# Patient Record
Sex: FEMALE | Race: BLACK OR AFRICAN AMERICAN | HISPANIC OR LATINO | URBAN - METROPOLITAN AREA
[De-identification: names, ages, dates, MRNs, and addresses within clinical notes are randomized per-mention and may not be internally consistent; named-entity substitution may affect disease eponyms.]

---

## 2018-12-17 ENCOUNTER — EMERGENCY (EMERGENCY)
Facility: HOSPITAL | Age: 23
LOS: 0 days | Discharge: HOME | End: 2018-12-17
Attending: EMERGENCY MEDICINE
Payer: COMMERCIAL

## 2018-12-17 VITALS
SYSTOLIC BLOOD PRESSURE: 141 MMHG | HEART RATE: 89 BPM | DIASTOLIC BLOOD PRESSURE: 79 MMHG | TEMPERATURE: 98 F | RESPIRATION RATE: 16 BRPM | OXYGEN SATURATION: 99 %

## 2018-12-17 VITALS
WEIGHT: 225.09 LBS | OXYGEN SATURATION: 98 % | TEMPERATURE: 98 F | HEART RATE: 76 BPM | DIASTOLIC BLOOD PRESSURE: 79 MMHG | SYSTOLIC BLOOD PRESSURE: 129 MMHG | HEIGHT: 63 IN | RESPIRATION RATE: 17 BRPM

## 2018-12-17 DIAGNOSIS — F32.9 MAJOR DEPRESSIVE DISORDER, SINGLE EPISODE, UNSPECIFIED: ICD-10-CM

## 2018-12-17 DIAGNOSIS — F32.1 MAJOR DEPRESSIVE DISORDER, SINGLE EPISODE, MODERATE: ICD-10-CM

## 2018-12-17 LAB
ALBUMIN SERPL ELPH-MCNC: 4 G/DL — SIGNIFICANT CHANGE UP (ref 3.3–5)
ALP SERPL-CCNC: 92 U/L — SIGNIFICANT CHANGE UP (ref 40–120)
ALT FLD-CCNC: 18 U/L — SIGNIFICANT CHANGE UP (ref 12–78)
AMPHET UR-MCNC: NEGATIVE — SIGNIFICANT CHANGE UP
ANION GAP SERPL CALC-SCNC: 8 MMOL/L — SIGNIFICANT CHANGE UP (ref 5–17)
APAP SERPL-MCNC: < 2 UG/ML (ref 10–30)
APPEARANCE UR: CLEAR — SIGNIFICANT CHANGE UP
APTT BLD: 32.7 SEC — SIGNIFICANT CHANGE UP (ref 27.5–36.3)
AST SERPL-CCNC: 12 U/L — LOW (ref 15–37)
BACTERIA # UR AUTO: ABNORMAL
BARBITURATES UR SCN-MCNC: NEGATIVE — SIGNIFICANT CHANGE UP
BASOPHILS # BLD AUTO: 0.06 K/UL — SIGNIFICANT CHANGE UP (ref 0–0.2)
BASOPHILS NFR BLD AUTO: 0.5 % — SIGNIFICANT CHANGE UP (ref 0–2)
BENZODIAZ UR-MCNC: NEGATIVE — SIGNIFICANT CHANGE UP
BILIRUB SERPL-MCNC: 0.2 MG/DL — SIGNIFICANT CHANGE UP (ref 0.2–1.2)
BILIRUB UR-MCNC: NEGATIVE — SIGNIFICANT CHANGE UP
BUN SERPL-MCNC: 10 MG/DL — SIGNIFICANT CHANGE UP (ref 7–23)
CALCIUM SERPL-MCNC: 8.8 MG/DL — SIGNIFICANT CHANGE UP (ref 8.5–10.1)
CHLORIDE SERPL-SCNC: 107 MMOL/L — SIGNIFICANT CHANGE UP (ref 96–108)
CO2 SERPL-SCNC: 24 MMOL/L — SIGNIFICANT CHANGE UP (ref 22–31)
COCAINE METAB.OTHER UR-MCNC: NEGATIVE — SIGNIFICANT CHANGE UP
COLOR SPEC: YELLOW — SIGNIFICANT CHANGE UP
CREAT SERPL-MCNC: 0.71 MG/DL — SIGNIFICANT CHANGE UP (ref 0.5–1.3)
DIFF PNL FLD: ABNORMAL
EOSINOPHIL # BLD AUTO: 0.11 K/UL — SIGNIFICANT CHANGE UP (ref 0–0.5)
EOSINOPHIL NFR BLD AUTO: 0.9 % — SIGNIFICANT CHANGE UP (ref 0–6)
EPI CELLS # UR: SIGNIFICANT CHANGE UP
ETHANOL SERPL-MCNC: <10 MG/DL — SIGNIFICANT CHANGE UP (ref 0–10)
GLUCOSE SERPL-MCNC: 92 MG/DL — SIGNIFICANT CHANGE UP (ref 70–99)
GLUCOSE UR QL: NEGATIVE — SIGNIFICANT CHANGE UP
HCG SERPL-ACNC: <1 MIU/ML — SIGNIFICANT CHANGE UP
HCT VFR BLD CALC: 41.1 % — SIGNIFICANT CHANGE UP (ref 34.5–45)
HGB BLD-MCNC: 13.3 G/DL — SIGNIFICANT CHANGE UP (ref 11.5–15.5)
IMM GRANULOCYTES NFR BLD AUTO: 0.2 % — SIGNIFICANT CHANGE UP (ref 0–1.5)
INR BLD: 1.19 RATIO — HIGH (ref 0.88–1.16)
KETONES UR-MCNC: NEGATIVE — SIGNIFICANT CHANGE UP
LEUKOCYTE ESTERASE UR-ACNC: NEGATIVE — SIGNIFICANT CHANGE UP
LYMPHOCYTES # BLD AUTO: 17.4 % — SIGNIFICANT CHANGE UP (ref 13–44)
LYMPHOCYTES # BLD AUTO: 2.03 K/UL — SIGNIFICANT CHANGE UP (ref 1–3.3)
MCHC RBC-ENTMCNC: 28.5 PG — SIGNIFICANT CHANGE UP (ref 27–34)
MCHC RBC-ENTMCNC: 32.4 GM/DL — SIGNIFICANT CHANGE UP (ref 32–36)
MCV RBC AUTO: 88 FL — SIGNIFICANT CHANGE UP (ref 80–100)
METHADONE UR-MCNC: NEGATIVE — SIGNIFICANT CHANGE UP
MONOCYTES # BLD AUTO: 0.83 K/UL — SIGNIFICANT CHANGE UP (ref 0–0.9)
MONOCYTES NFR BLD AUTO: 7.1 % — SIGNIFICANT CHANGE UP (ref 2–14)
NEUTROPHILS # BLD AUTO: 8.62 K/UL — HIGH (ref 1.8–7.4)
NEUTROPHILS NFR BLD AUTO: 73.9 % — SIGNIFICANT CHANGE UP (ref 43–77)
NITRITE UR-MCNC: NEGATIVE — SIGNIFICANT CHANGE UP
NRBC # BLD: 0 /100 WBCS — SIGNIFICANT CHANGE UP (ref 0–0)
OPIATES UR-MCNC: NEGATIVE — SIGNIFICANT CHANGE UP
PCP SPEC-MCNC: SIGNIFICANT CHANGE UP
PCP UR-MCNC: NEGATIVE — SIGNIFICANT CHANGE UP
PH UR: 7 — SIGNIFICANT CHANGE UP (ref 5–8)
PLATELET # BLD AUTO: 333 K/UL — SIGNIFICANT CHANGE UP (ref 150–400)
POTASSIUM SERPL-MCNC: 4.2 MMOL/L — SIGNIFICANT CHANGE UP (ref 3.5–5.3)
POTASSIUM SERPL-SCNC: 4.2 MMOL/L — SIGNIFICANT CHANGE UP (ref 3.5–5.3)
PROT SERPL-MCNC: 7.9 GM/DL — SIGNIFICANT CHANGE UP (ref 6–8.3)
PROT UR-MCNC: NEGATIVE — SIGNIFICANT CHANGE UP
PROTHROM AB SERPL-ACNC: 13.4 SEC — HIGH (ref 10–12.9)
RBC # BLD: 4.67 M/UL — SIGNIFICANT CHANGE UP (ref 3.8–5.2)
RBC # FLD: 13.3 % — SIGNIFICANT CHANGE UP (ref 10.3–14.5)
RBC CASTS # UR COMP ASSIST: ABNORMAL /HPF (ref 0–4)
SALICYLATES SERPL-MCNC: <1.7 MG/DL — LOW (ref 2.8–20)
SODIUM SERPL-SCNC: 139 MMOL/L — SIGNIFICANT CHANGE UP (ref 135–145)
SP GR SPEC: 1.01 — SIGNIFICANT CHANGE UP (ref 1.01–1.02)
THC UR QL: NEGATIVE — SIGNIFICANT CHANGE UP
UROBILINOGEN FLD QL: NEGATIVE — SIGNIFICANT CHANGE UP
WBC # BLD: 11.67 K/UL — HIGH (ref 3.8–10.5)
WBC # FLD AUTO: 11.67 K/UL — HIGH (ref 3.8–10.5)
WBC UR QL: NEGATIVE — SIGNIFICANT CHANGE UP

## 2018-12-17 PROCEDURE — 99284 EMERGENCY DEPT VISIT MOD MDM: CPT

## 2018-12-17 PROCEDURE — 90792 PSYCH DIAG EVAL W/MED SRVCS: CPT | Mod: GT

## 2018-12-17 RX ORDER — ACETAMINOPHEN 500 MG
650 TABLET ORAL ONCE
Qty: 0 | Refills: 0 | Status: COMPLETED | OUTPATIENT
Start: 2018-12-17 | End: 2018-12-17

## 2018-12-17 RX ADMIN — Medication 650 MILLIGRAM(S): at 19:23

## 2018-12-17 NOTE — ED BEHAVIORAL HEALTH ASSESSMENT NOTE - OTHER PAST PSYCHIATRIC HISTORY (INCLUDE DETAILS REGARDING ONSET, COURSE OF ILLNESS, INPATIENT/OUTPATIENT TREATMENT)
one prior psychiatric admission in 2014 for suicidal ideation and overdose on pills, currently in outpatient therapy with Dr Valverde since Sept '18 Respiratory
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
## 2018-12-17 NOTE — ED PROVIDER NOTE - OBJECTIVE STATEMENT
22 yo female with history of one suicidal attempt in the past as teenager with pill ingestion (2 week hospitalization at that time) presents  states that she has "depression". Patient with intermittent suicidal ideology without plan. Patient states that she has lost two family confidantes in last 1.5 years. Patient denies alcohol or pill ingestion, HI, fever, chills, cough, recent suicidal attempt. Patient states that she feels that if she does not speak to someone she will continue to get "worse".
Yes

## 2018-12-17 NOTE — ED BEHAVIORAL HEALTH ASSESSMENT NOTE - DESCRIPTION
n/a lives with her family, works at [employer] Patient arrived to the ED approximately 5 hours prior to consultation. Per ED RN and documentation patient arrived alert and oriented x3, no issues with grooming or hygiene, cooperative with ED medical and safety protocols with no items of note in property. Patient has depressed mood and affect, she is tearful at times in the ED, she has linear thought process and normal speech, she denies current suicidal or homicidal ideation, she does not appear psychotic, manic, delusional or paranoid, no other noted relevant MSE elements by ED staff. Patient was not observed to eat or sleep at time of contact. Patient was calm and appropriate in the ED, did not require PRN psychiatric medication. Patient’s father is at bedside and there are no issues noted with interaction.

## 2018-12-17 NOTE — ED PROVIDER NOTE - CONSTITUTIONAL, MLM
normal... Well appearing, well nourished, awake, alert, oriented to person, place, time/situation and in no apparent distress, tearful

## 2018-12-17 NOTE — ED BEHAVIORAL HEALTH ASSESSMENT NOTE - RISK ASSESSMENT
She is at moderately elevated risk of danger to herself given her psychiatric history and suicide attempt, however she is at low risk of acute and imminent danger to herself based on her current mental status exam.

## 2018-12-17 NOTE — ED ADULT NURSE NOTE - CAS EDP DISCH TYPE
I have reviewed the H&P which is current within the last 30 days. I have examined the patient, and the changes to the patient's condition are as follows: none. Risks and benefits, as well as alternatives of umbilical hernia repair and liver biopsy were discussed with the patient and they would like to proceed.     Visit Vitals  /70   Pulse 70   Temp 97.2 °F (36.2 °C) (Temporal Artery)   Resp 18   Ht 5' 2\" (1.575 m)   Wt 74.1 kg   SpO2 97%   BMI 29.88 kg/m²       Date of service: 11/20/2017    
I reviewed the H&P, I examined the patient, and there are no changes in the patient's condition.    
Home

## 2018-12-17 NOTE — ED PROVIDER NOTE - MEDICAL DECISION MAKING DETAILS
Patient awaiting psych consult Patient awaiting psych consult, medically stable for pyschiatric consult

## 2018-12-17 NOTE — ED ADULT NURSE NOTE - OBJECTIVE STATEMENT
Pt state I feeling really sad. hx of depression. Denies any SI or HI. CO initiated. Father at the bedside

## 2018-12-17 NOTE — ED BEHAVIORAL HEALTH ASSESSMENT NOTE - SUICIDE PROTECTIVE FACTORS
Identifies reasons for living/Future oriented/Responsibility to family and others/Positive therapeutic relationships/Supportive social network or family/Engaged in work or school

## 2018-12-17 NOTE — ED PROVIDER NOTE - PROGRESS NOTE DETAILS
Dr. Alan aware of consult Patient received in sign out from Dr. Kay pending work up by psychiatry for suicidal ideation.  Patient received evaluation and is cleared by psych for o/p care with her therapist, also psych resources provided. Patient discharged to care of her father who is present in the ER. Discussed results and outcome of today's visit with the patient.  Patient advised to please follow up with another healthcare provider within the next 24 hours and return to the Emergency Department for worsening symptoms or any other concerns.  Patient advised that their doctor may call  to follow up on the specific results of the tests performed today in the emergency department.   Patient appears well on discharge. Patient received in sign out from Dr. Kay pending work up by psychiatry for suicidal ideation.  Patient received evaluation and is cleared by psych for o/p care with her therapist, also psych resources provided, no meds recommended at this time. Patient discharged to care of her father who is present in the ER. Discussed results and outcome of today's visit with the patient.  Patient advised to please follow up with another healthcare provider within the next 24 hours and return to the Emergency Department for worsening symptoms or any other concerns.  Patient advised that their doctor may call  to follow up on the specific results of the tests performed today in the emergency department.   Patient appears well on discharge.

## 2018-12-17 NOTE — ED BEHAVIORAL HEALTH ASSESSMENT NOTE - HPI (INCLUDE ILLNESS QUALITY, SEVERITY, DURATION, TIMING, CONTEXT, MODIFYING FACTORS, ASSOCIATED SIGNS AND SYMPTOMS)
24 yo [employer] [facility] F with a history of depression, one prior suicide attempt, one prior inpatient hospitalization in '14, in therapy with Dr Valverde, employed at [employer], domiciled with her family, not in school, with no legal history presents with complaints of suicidal ideation; in the context of worsening depression since Aug '18 and a variety of psychosocial stressors. Pt states that she was feeling depressed and having a suicidal thought, and couldn't speak to her mother who is in [place] for the next two weeks, while her therapist is on vacation for the next two weeks as well. Pt states that she had no one else to talk to. Describes that she feels guilty and feels like a burden if she were to talk to someone in her family about her stress and depression. 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 Pt reports that she feels sad, and has thought about not going into work, but has not missed a day yet. Denies any suicidal ideation or NSSI urges at this time. States that she has sleep struggles for the past few weeks. Denies AVH / PI / IOR. Denies any decreased need for sleep with increased energy. No evidence of acute psychosis or shiva on exam. 22 [facility] F with a history of depression, one prior suicide attempt, one prior inpatient hospitalization in , in therapy with Dr Valverde, employed at [employer], domiciled with her family, not in school, with no legal history presents with complaints of suicidal ideation; in the context of worsening depression since Aug '18 and a variety of psychosocial stressors. Pt states that she was feeling depressed and having a suicidal thought, and couldn't speak to her mother who is in [facility] for the next two weeks, while her therapist is on vacation for the next two weeks as well. Pt states that she had no one else to talk to. Describes that she feels guilty and feels like a burden if she were to talk to someone in her family about her stress and depression. 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 Pt reports that she feels sad, and has thought about not going into work, but has not missed a day yet. Denies any suicidal ideation or NSSI urges at this time. States that she has sleep struggles for the past few weeks. Denies AVH / PI / IOR. Denies any decreased need for sleep with increased energy. No evidence of acute psychosis or shiva on exam.    Collateral obtained from father: Per collateral the HPI begins 3 weeks ago. 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 Collateral states patient at baseline has intermittent periods of depression for the past several years where patient has depressed mood, crying, and isolation. Patient drinks Etoh socially, no known drug use, daily cigarette smoker. Patient has 1 prior hospitalization at [facility] 2014, per father this was for suicidal ideation, however patient reported it was a suicide attempt by overdose. Patient was in treatment briefly after first hospitalization but has spent the past several years without treatment or medication until starting to see a therapist weekly 3 months ago. Collateral states patient’s aunt  around 2 months ago and her great-grandmother  last month. 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 For the past 3 weeks patient has been crying often, depressed mood, isolative, and staying up late on her phone. Last week patient called mother and reported thoughts of hurting herself, she was able to be consoled by family at that time and no acute intervention occurred. Today patient called her father and told her she was again having thoughts of hurting herself without plan or intent but wanted to speak with someone so father assisted patient to the ED. Father states patient has not been reporting suicidal ideation since she last called her mother, no self-harm, patient has no thoughts or harming others and has not been violent. Patient has no reported/observed symptoms of psychosis or shiva, no other changes in mood or behavior noted. Patient has been sleeping, eating, and tending to ADLS, she has been making it to work. Father does not believe patient is an acute risk to self or others, believes patient needs to consider medication for her depression. Father feels patient is safe to be discharged home at this time.

## 2019-04-25 ENCOUNTER — RESULT REVIEW (OUTPATIENT)
Age: 24
End: 2019-04-25

## 2019-09-07 ENCOUNTER — EMERGENCY (EMERGENCY)
Facility: HOSPITAL | Age: 24
LOS: 1 days | Discharge: HOME | End: 2019-09-07
Attending: EMERGENCY MEDICINE | Admitting: EMERGENCY MEDICINE
Payer: COMMERCIAL

## 2019-09-07 VITALS
RESPIRATION RATE: 16 BRPM | HEART RATE: 86 BPM | TEMPERATURE: 98 F | OXYGEN SATURATION: 98 % | DIASTOLIC BLOOD PRESSURE: 65 MMHG | SYSTOLIC BLOOD PRESSURE: 120 MMHG

## 2019-09-07 LAB
ALBUMIN SERPL ELPH-MCNC: 4.2 G/DL — SIGNIFICANT CHANGE UP (ref 3.3–5)
ALP SERPL-CCNC: 61 U/L — SIGNIFICANT CHANGE UP (ref 40–120)
ALT FLD-CCNC: 18 U/L — SIGNIFICANT CHANGE UP (ref 4–33)
ANION GAP SERPL CALC-SCNC: 11 MMO/L — SIGNIFICANT CHANGE UP (ref 7–14)
APTT BLD: 29.4 SEC — SIGNIFICANT CHANGE UP (ref 27.5–36.3)
AST SERPL-CCNC: 14 U/L — SIGNIFICANT CHANGE UP (ref 4–32)
BASOPHILS # BLD AUTO: 0.07 K/UL — SIGNIFICANT CHANGE UP (ref 0–0.2)
BASOPHILS NFR BLD AUTO: 0.7 % — SIGNIFICANT CHANGE UP (ref 0–2)
BILIRUB SERPL-MCNC: 0.2 MG/DL — SIGNIFICANT CHANGE UP (ref 0.2–1.2)
BUN SERPL-MCNC: 9 MG/DL — SIGNIFICANT CHANGE UP (ref 7–23)
CALCIUM SERPL-MCNC: 9.6 MG/DL — SIGNIFICANT CHANGE UP (ref 8.4–10.5)
CHLORIDE SERPL-SCNC: 103 MMOL/L — SIGNIFICANT CHANGE UP (ref 98–107)
CO2 SERPL-SCNC: 25 MMOL/L — SIGNIFICANT CHANGE UP (ref 22–31)
CREAT SERPL-MCNC: 0.7 MG/DL — SIGNIFICANT CHANGE UP (ref 0.5–1.3)
EOSINOPHIL # BLD AUTO: 0.3 K/UL — SIGNIFICANT CHANGE UP (ref 0–0.5)
EOSINOPHIL NFR BLD AUTO: 3 % — SIGNIFICANT CHANGE UP (ref 0–6)
GLUCOSE SERPL-MCNC: 85 MG/DL — SIGNIFICANT CHANGE UP (ref 70–99)
HCG SERPL-ACNC: < 5 MIU/ML — SIGNIFICANT CHANGE UP
HCT VFR BLD CALC: 40.5 % — SIGNIFICANT CHANGE UP (ref 34.5–45)
HGB BLD-MCNC: 13 G/DL — SIGNIFICANT CHANGE UP (ref 11.5–15.5)
IMM GRANULOCYTES NFR BLD AUTO: 0.5 % — SIGNIFICANT CHANGE UP (ref 0–1.5)
INR BLD: 1.01 — SIGNIFICANT CHANGE UP (ref 0.88–1.17)
LYMPHOCYTES # BLD AUTO: 2.32 K/UL — SIGNIFICANT CHANGE UP (ref 1–3.3)
LYMPHOCYTES # BLD AUTO: 23.5 % — SIGNIFICANT CHANGE UP (ref 13–44)
MCHC RBC-ENTMCNC: 28.8 PG — SIGNIFICANT CHANGE UP (ref 27–34)
MCHC RBC-ENTMCNC: 32.1 % — SIGNIFICANT CHANGE UP (ref 32–36)
MCV RBC AUTO: 89.6 FL — SIGNIFICANT CHANGE UP (ref 80–100)
MONOCYTES # BLD AUTO: 0.66 K/UL — SIGNIFICANT CHANGE UP (ref 0–0.9)
MONOCYTES NFR BLD AUTO: 6.7 % — SIGNIFICANT CHANGE UP (ref 2–14)
NEUTROPHILS # BLD AUTO: 6.47 K/UL — SIGNIFICANT CHANGE UP (ref 1.8–7.4)
NEUTROPHILS NFR BLD AUTO: 65.6 % — SIGNIFICANT CHANGE UP (ref 43–77)
NRBC # FLD: 0 K/UL — SIGNIFICANT CHANGE UP (ref 0–0)
PLATELET # BLD AUTO: 319 K/UL — SIGNIFICANT CHANGE UP (ref 150–400)
PMV BLD: 10.7 FL — SIGNIFICANT CHANGE UP (ref 7–13)
POTASSIUM SERPL-MCNC: 4.2 MMOL/L — SIGNIFICANT CHANGE UP (ref 3.5–5.3)
POTASSIUM SERPL-SCNC: 4.2 MMOL/L — SIGNIFICANT CHANGE UP (ref 3.5–5.3)
PROT SERPL-MCNC: 7.5 G/DL — SIGNIFICANT CHANGE UP (ref 6–8.3)
PROTHROM AB SERPL-ACNC: 11.5 SEC — SIGNIFICANT CHANGE UP (ref 9.8–13.1)
RBC # BLD: 4.52 M/UL — SIGNIFICANT CHANGE UP (ref 3.8–5.2)
RBC # FLD: 13.2 % — SIGNIFICANT CHANGE UP (ref 10.3–14.5)
SODIUM SERPL-SCNC: 139 MMOL/L — SIGNIFICANT CHANGE UP (ref 135–145)
WBC # BLD: 9.87 K/UL — SIGNIFICANT CHANGE UP (ref 3.8–10.5)
WBC # FLD AUTO: 9.87 K/UL — SIGNIFICANT CHANGE UP (ref 3.8–10.5)

## 2019-09-07 PROCEDURE — 99284 EMERGENCY DEPT VISIT MOD MDM: CPT

## 2019-09-07 PROCEDURE — 76830 TRANSVAGINAL US NON-OB: CPT | Mod: 26

## 2019-09-07 RX ORDER — ACETAMINOPHEN 500 MG
975 TABLET ORAL ONCE
Refills: 0 | Status: DISCONTINUED | OUTPATIENT
Start: 2019-09-07 | End: 2019-09-11

## 2019-09-07 NOTE — ED PROVIDER NOTE - NS ED ROS FT
General: +fatigue; denies fever, chills, weight loss/weight gain.  HENT: denies nasal congestion, sore throat, rhinorrhea, ear pain  Eyes: denies visual changes, blurred vision, eye discharge, eye redness  Neck: denies neck pain, neck swelling  CV: denies chest pain, palpitations  Resp: denies difficulty breathing, cough  Abdominal: denies nausea, vomiting, diarrhea, abdominal pain, blood in stool, dark stool  :  Vaginal bleed and cramping  MSK: denies muscle aches, bony pain, leg pain, leg swelling  Neuro: denies headaches, numbness, tingling, dizziness, lightheadedness.  Skin: denies rashes, cuts, bruises  Hematologic: denies unexplained bruises

## 2019-09-07 NOTE — ED PROVIDER NOTE - PATIENT PORTAL LINK FT
You can access the FollowMyHealth Patient Portal offered by [facility] by registering at the following website: [email or web address]. By joining [facility]’s FollowMyHealth portal, you will also be able to view your health information using other applications (apps) compatible with our system.

## 2019-09-07 NOTE — ED PROVIDER NOTE - PHYSICAL EXAMINATION
GENERAL: Patient awake alert NAD.  HEENT: NC/AT, Moist mucous membranes  LUNGS: CTAB, no wheezes or crackles.   CARDIAC: RRR, no m/r/g.  ABDOMEN: Soft, NT, ND, No rebound, guarding. No CVA tenderness.   :  Pelvic exam performed with Nelly Franklin RN as chaperone.  Blood seen in the vaginal vault, no discharge or CMT.  Right adnexal tenderness on bimanual exam.  EXT: No edema. No calf tenderness. CV 2+DP/PT bilaterally.   MSK: No pain with movement, no deformities.  NEURO: A&Ox3. Moving all extremities.  SKIN: Warm and dry. No rash.  PSYCH: Normal affect. GENERAL: Patient awake alert NAD.  HEENT: NC/AT, Moist mucous membranes  LUNGS: CTAB, no wheezes or crackles.   CARDIAC: RRR, no m/r/g.  ABDOMEN: Soft, NT, ND, No rebound, guarding. No CVA tenderness.   :  Pelvic exam performed with Nelly Franklin RN as chaperone.  Blood seen in the vaginal vault, no discharge or CMT.  Right adnexal tenderness on bimanual exam.  EXT: No edema. No calf tenderness. CV 2+DP/PT bilaterally.   MSK: No pain with movement, no deformities.  NEURO: A&Ox3. Moving all extremities.  SKIN: Warm and dry. No rash.  PSYCH: Normal affect.    Attending/Mahad: NAD; PERRL/EOMI, non-icterus, supple, no SAÚL, no JVD, RRR, CTAB; Abd-soft, NT/ND, no HSM; no LE edema, A&Ox3, nonfocal; Skin-warm/dry  Pelvic Ex- as per resident

## 2019-09-07 NOTE — ED PROVIDER NOTE - CLINICAL SUMMARY MEDICAL DECISION MAKING FREE TEXT BOX
Pt. is a 24 y.o. F w/ PMHx of recently diagnosed endometriosis started on OCP 3 weeks ago, ovarian cysts, depression, asthma p/w vaginal bleed for 8 days.  Considering the recently diagnosed endometriosis and hx of ovarian cysts, this is the likely cause.  Unlikely to be ectopic pregnancy based on lack of prior abortions/miscarriages, but will get serum HCG to confirm.  Unlikely to be ovarian torsion, but will get TVUS to make sure.  Will consult obgyn as pt. is complaining of fatigue.

## 2019-09-07 NOTE — ED PROVIDER NOTE - PROGRESS NOTE DETAILS
José Miguel PGY 1 - Discussed results w/ pt.  She will f/u w/ obgyn early next week.  Pt. is agreeable to d/c w/ good f/u and ED return precautions.

## 2019-09-07 NOTE — ED ADULT TRIAGE NOTE - CHIEF COMPLAINT QUOTE
vaginal spotting started 9 days ago.reports increased bleeding since sunday. reports changing pad hourly since wed. lower abd cramping.leg cramping.  strted bcpatch 3 wks ago

## 2019-09-07 NOTE — ED PROVIDER NOTE - OBJECTIVE STATEMENT
Pt. is a 24 y.o. F w/ PMHx of recently diagnosed endometriosis, ovarian cysts, asthma, depression p/w vaginal bleed since last Friday.  Pt. started having her period last Friday and her vaginal bleeding hasn't stopped yet.  Per pt., she is going through about 8-10 pads per day.  Pt. states her periods usually last about 1 week and has heavy bleeding, but this is concerning because she's starting to feel more fatigued and sometimes very sleepy at work.  Pt. also states her abdominal cramps are worse than usual.  Pt. is sexually active, but does not have vaginal intercourse.  Pt. has not been pregnant in the past.  She was started on OCPs about 3 weeks ago as her obgyn, Dr. Tacos De La Rosa, said she has the beginning stages of endometriosis.  Pt. got her TVUS at [facility]. Pt. is a 24 y.o. F w/ PMHx of recently diagnosed endometriosis, ovarian cysts, asthma, depression p/w vaginal bleed since last Friday.  Pt. started having her period last Friday and her vaginal bleeding hasn't stopped yet.  Per pt., she is going through about 8-10 pads per day.  Pt. states her periods usually last about 1 week and has heavy bleeding, but this is concerning because she's starting to feel more fatigued and sometimes very sleepy at work.  Pt. also states her abdominal cramps are worse than usual.  Pt. is sexually active, but does not have vaginal intercourse.  Pt. has not been pregnant in the past.  She was started on OCPs about 3 weeks ago as her obgyn, Dr. Tacos De La Rosa, said she has the beginning stages of endometriosis.  Pt. got her TVUS at [facility].    Attending/Mahad: 25 yo F as described above, h/o endometriosis p/w vag bleeding since Friday using multiple pads per day. While pt reports general fatigue, deneis CP, SOB, palp or weakness.

## 2019-09-07 NOTE — ED PROVIDER NOTE - NSFOLLOWUPINSTRUCTIONS_ED_ALL_ED_FT
Rest, drink plenty of fluids.  Advance activity as tolerated.  Continue all previously prescribed medications as directed.  Follow up with your primary care physician in 48-72 hours- bring copies of your results.  Return to the ER for worsening or persistent symptoms, and/or ANY NEW OR CONCERNING SYMPTOMS. If you have issues obtaining follow up, please call: [phone] to obtain a doctor or specialist who takes your insurance in your area.    Please call and make an appointment w/ your obgyn doctor sometime next week.  In the meantime, you can take 600-800mg of motrin and 1000mg of tylenol every 6 hours alternatively as needed for your pain.  Please follow the instructions on the bottle and don't exceed the maximum daily dosage of either medication.  If you have significantly more vaginal bleeding or experience pain that is absolutely intolerable, please return immediately to the emergency department.

## 2023-01-11 ENCOUNTER — EMERGENCY (EMERGENCY)
Facility: HOSPITAL | Age: 28
LOS: 1 days | Discharge: HOME | End: 2023-01-11
Attending: EMERGENCY MEDICINE | Admitting: EMERGENCY MEDICINE
Payer: COMMERCIAL

## 2023-01-11 VITALS
TEMPERATURE: 98 F | OXYGEN SATURATION: 98 % | DIASTOLIC BLOOD PRESSURE: 68 MMHG | HEART RATE: 88 BPM | RESPIRATION RATE: 16 BRPM | SYSTOLIC BLOOD PRESSURE: 125 MMHG

## 2023-01-11 VITALS
RESPIRATION RATE: 20 BRPM | OXYGEN SATURATION: 99 % | HEIGHT: 63 IN | DIASTOLIC BLOOD PRESSURE: 80 MMHG | TEMPERATURE: 98 F | HEART RATE: 98 BPM | SYSTOLIC BLOOD PRESSURE: 130 MMHG | WEIGHT: 259.93 LBS

## 2023-01-11 LAB
ALBUMIN SERPL ELPH-MCNC: 3.9 G/DL — SIGNIFICANT CHANGE UP (ref 3.3–5)
ALP SERPL-CCNC: 97 U/L — SIGNIFICANT CHANGE UP (ref 30–120)
ALT FLD-CCNC: 19 U/L DA — SIGNIFICANT CHANGE UP (ref 10–60)
ANION GAP SERPL CALC-SCNC: 9 MMOL/L — SIGNIFICANT CHANGE UP (ref 5–17)
AST SERPL-CCNC: 16 U/L — SIGNIFICANT CHANGE UP (ref 10–40)
BASOPHILS # BLD AUTO: 0.07 K/UL — SIGNIFICANT CHANGE UP (ref 0–0.2)
BASOPHILS NFR BLD AUTO: 0.6 % — SIGNIFICANT CHANGE UP (ref 0–2)
BILIRUB SERPL-MCNC: 0.2 MG/DL — SIGNIFICANT CHANGE UP (ref 0.2–1.2)
BUN SERPL-MCNC: 10 MG/DL — SIGNIFICANT CHANGE UP (ref 7–23)
CALCIUM SERPL-MCNC: 8.9 MG/DL — SIGNIFICANT CHANGE UP (ref 8.4–10.5)
CHLORIDE SERPL-SCNC: 102 MMOL/L — SIGNIFICANT CHANGE UP (ref 96–108)
CO2 SERPL-SCNC: 28 MMOL/L — SIGNIFICANT CHANGE UP (ref 22–31)
CREAT SERPL-MCNC: 0.75 MG/DL — SIGNIFICANT CHANGE UP (ref 0.5–1.3)
EGFR: 111 ML/MIN/1.73M2 — SIGNIFICANT CHANGE UP
EOSINOPHIL # BLD AUTO: 0.1 K/UL — SIGNIFICANT CHANGE UP (ref 0–0.5)
EOSINOPHIL NFR BLD AUTO: 0.9 % — SIGNIFICANT CHANGE UP (ref 0–6)
GLUCOSE SERPL-MCNC: 113 MG/DL — HIGH (ref 70–99)
HCG UR QL: NEGATIVE — SIGNIFICANT CHANGE UP
HCT VFR BLD CALC: 41 % — SIGNIFICANT CHANGE UP (ref 34.5–45)
HGB BLD-MCNC: 13.5 G/DL — SIGNIFICANT CHANGE UP (ref 11.5–15.5)
IMM GRANULOCYTES NFR BLD AUTO: 0.3 % — SIGNIFICANT CHANGE UP (ref 0–0.9)
LIDOCAIN IGE QN: 58 U/L — LOW (ref 73–393)
LYMPHOCYTES # BLD AUTO: 2.58 K/UL — SIGNIFICANT CHANGE UP (ref 1–3.3)
LYMPHOCYTES # BLD AUTO: 22 % — SIGNIFICANT CHANGE UP (ref 13–44)
MCHC RBC-ENTMCNC: 29 PG — SIGNIFICANT CHANGE UP (ref 27–34)
MCHC RBC-ENTMCNC: 32.9 GM/DL — SIGNIFICANT CHANGE UP (ref 32–36)
MCV RBC AUTO: 88.2 FL — SIGNIFICANT CHANGE UP (ref 80–100)
MONOCYTES # BLD AUTO: 0.85 K/UL — SIGNIFICANT CHANGE UP (ref 0–0.9)
MONOCYTES NFR BLD AUTO: 7.2 % — SIGNIFICANT CHANGE UP (ref 2–14)
NEUTROPHILS # BLD AUTO: 8.1 K/UL — HIGH (ref 1.8–7.4)
NEUTROPHILS NFR BLD AUTO: 69 % — SIGNIFICANT CHANGE UP (ref 43–77)
NRBC # BLD: 0 /100 WBCS — SIGNIFICANT CHANGE UP (ref 0–0)
PLATELET # BLD AUTO: 396 K/UL — SIGNIFICANT CHANGE UP (ref 150–400)
POTASSIUM SERPL-MCNC: 4.2 MMOL/L — SIGNIFICANT CHANGE UP (ref 3.5–5.3)
POTASSIUM SERPL-SCNC: 4.2 MMOL/L — SIGNIFICANT CHANGE UP (ref 3.5–5.3)
PROT SERPL-MCNC: 7.2 G/DL — SIGNIFICANT CHANGE UP (ref 6–8.3)
RBC # BLD: 4.65 M/UL — SIGNIFICANT CHANGE UP (ref 3.8–5.2)
RBC # FLD: 13.3 % — SIGNIFICANT CHANGE UP (ref 10.3–14.5)
SODIUM SERPL-SCNC: 139 MMOL/L — SIGNIFICANT CHANGE UP (ref 135–145)
WBC # BLD: 11.73 K/UL — HIGH (ref 3.8–10.5)
WBC # FLD AUTO: 11.73 K/UL — HIGH (ref 3.8–10.5)

## 2023-01-11 PROCEDURE — 71250 CT THORAX DX C-: CPT | Mod: MA

## 2023-01-11 PROCEDURE — 80053 COMPREHEN METABOLIC PANEL: CPT

## 2023-01-11 PROCEDURE — 74176 CT ABD & PELVIS W/O CONTRAST: CPT | Mod: MA

## 2023-01-11 PROCEDURE — 83690 ASSAY OF LIPASE: CPT

## 2023-01-11 PROCEDURE — 72125 CT NECK SPINE W/O DYE: CPT | Mod: MA

## 2023-01-11 PROCEDURE — 73502 X-RAY EXAM HIP UNI 2-3 VIEWS: CPT | Mod: 26,RT

## 2023-01-11 PROCEDURE — 71250 CT THORAX DX C-: CPT | Mod: 26,MA

## 2023-01-11 PROCEDURE — 74176 CT ABD & PELVIS W/O CONTRAST: CPT | Mod: 26,MA

## 2023-01-11 PROCEDURE — 70450 CT HEAD/BRAIN W/O DYE: CPT | Mod: MA

## 2023-01-11 PROCEDURE — 81025 URINE PREGNANCY TEST: CPT

## 2023-01-11 PROCEDURE — 85025 COMPLETE CBC W/AUTO DIFF WBC: CPT

## 2023-01-11 PROCEDURE — 70450 CT HEAD/BRAIN W/O DYE: CPT | Mod: 26,MA

## 2023-01-11 PROCEDURE — 99284 EMERGENCY DEPT VISIT MOD MDM: CPT | Mod: 25

## 2023-01-11 PROCEDURE — 99285 EMERGENCY DEPT VISIT HI MDM: CPT

## 2023-01-11 PROCEDURE — 36415 COLL VENOUS BLD VENIPUNCTURE: CPT

## 2023-01-11 PROCEDURE — 72125 CT NECK SPINE W/O DYE: CPT | Mod: 26,MA

## 2023-01-11 PROCEDURE — 96365 THER/PROPH/DIAG IV INF INIT: CPT

## 2023-01-11 PROCEDURE — 73502 X-RAY EXAM HIP UNI 2-3 VIEWS: CPT

## 2023-01-11 RX ORDER — ACETAMINOPHEN 500 MG
1000 TABLET ORAL ONCE
Refills: 0 | Status: COMPLETED | OUTPATIENT
Start: 2023-01-11 | End: 2023-01-11

## 2023-01-11 RX ORDER — SODIUM CHLORIDE 9 MG/ML
1000 INJECTION INTRAMUSCULAR; INTRAVENOUS; SUBCUTANEOUS ONCE
Refills: 0 | Status: COMPLETED | OUTPATIENT
Start: 2023-01-11 | End: 2023-01-11

## 2023-01-11 RX ADMIN — SODIUM CHLORIDE 1000 MILLILITER(S): 9 INJECTION INTRAMUSCULAR; INTRAVENOUS; SUBCUTANEOUS at 17:42

## 2023-01-11 RX ADMIN — Medication 400 MILLIGRAM(S): at 17:41

## 2023-01-11 RX ADMIN — SODIUM CHLORIDE 1000 MILLILITER(S): 9 INJECTION INTRAMUSCULAR; INTRAVENOUS; SUBCUTANEOUS at 18:08

## 2023-01-11 RX ADMIN — Medication 1000 MILLIGRAM(S): at 18:08

## 2023-01-11 RX ADMIN — Medication 1000 MILLIGRAM(S): at 18:00

## 2023-01-11 NOTE — ED PROVIDER NOTE - DIFFERENTIAL DIAGNOSIS
Differentials include but not limited to neck strain, thoracic back strain, chest wall contusion, rib fracture, abdominal contusion, injury to liver or spleen, hip strain, hip fracture. Differential Diagnosis

## 2023-01-11 NOTE — ED PROVIDER NOTE - PATIENT PORTAL LINK FT
You can access the FollowMyHealth Patient Portal offered by [facility] by registering at the following website: [email or web address]. By joining [facility]’s FollowMyHealth portal, you will also be able to view your health information using other applications (apps) compatible with our system.
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
## 2023-01-11 NOTE — ED PROVIDER NOTE - MUSCULOSKELETAL, MLM
mild diffuse lower cervical and thoracic soft tissue tenderness. no step off deformities. right hip tenderness. no ext rotation or shortening

## 2023-01-11 NOTE — ED PROVIDER NOTE - CARE PROVIDER_API CALL
Danny Palafox)  Orthopaedic Surgery  [address] [place] [address]  Phone: [phone]  Fax: [phone]  Follow Up Time: 1-3 Days

## 2023-01-11 NOTE — ED PROVIDER NOTE - PROGRESS NOTE DETAILS
patient stable. overall pain improved. laughing and joking with family. abd soft on repeat exam, benign exam. CT results Discussed with patient.  No evidence of intracranial hemorrhage, skull fracture, rib fracture, pneumothorax, or injury to liver or spleen.  Will refer to orthopedics if pain continues or fails to improve.  Recommend to continue Tylenol or Motrin over-the-counter for pain and discomfort
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
## 2023-01-11 NOTE — ED PROVIDER NOTE - NS ED ATTENDING STATEMENT MOD
This was a shared visit with the [facility]. I reviewed and verified the documentation and independently performed the documented:

## 2023-03-14 NOTE — ED BEHAVIORAL HEALTH ASSESSMENT NOTE - SUMMARY
22 yo [facility] F with a history of depression, one prior suicide attempt, one prior inpatient hospitalization in '14, in therapy with Dr Valverde, employed at [employer], domiciled with her family, not in school, with no legal history presents with complaints of suicidal ideation; in the context of worsening depression since Aug '18 and a variety of psychosocial stressors. Currently, she reports feeling better after speaking to someone in the ED and no longer has suicidal ideation, but continues to feel sad and stressed about a variety of stressors in her life. Explored treatment options with her, including the use of medications to alleviate her depression. Pt is agreeable to this plan, and will also follow-up with her therapist once she returns back from vacation. No other interventions required at this time. Bactrim Pregnancy And Lactation Text: This medication is Pregnancy Category D and is known to cause fetal risk.  It is also excreted in breast milk.

## 2023-05-13 ENCOUNTER — EMERGENCY (EMERGENCY)
Facility: HOSPITAL | Age: 28
LOS: 1 days | Discharge: HOME | End: 2023-05-13
Attending: EMERGENCY MEDICINE | Admitting: EMERGENCY MEDICINE
Payer: COMMERCIAL

## 2023-05-13 VITALS
TEMPERATURE: 98 F | RESPIRATION RATE: 14 BRPM | OXYGEN SATURATION: 96 % | SYSTOLIC BLOOD PRESSURE: 130 MMHG | DIASTOLIC BLOOD PRESSURE: 73 MMHG | WEIGHT: 240.08 LBS | HEART RATE: 87 BPM | HEIGHT: 63 IN

## 2023-05-13 VITALS
RESPIRATION RATE: 15 BRPM | SYSTOLIC BLOOD PRESSURE: 126 MMHG | OXYGEN SATURATION: 99 % | HEART RATE: 80 BPM | DIASTOLIC BLOOD PRESSURE: 65 MMHG | TEMPERATURE: 98 F

## 2023-05-13 LAB
ALBUMIN SERPL ELPH-MCNC: 3.6 G/DL — SIGNIFICANT CHANGE UP (ref 3.3–5)
ALP SERPL-CCNC: 84 U/L — SIGNIFICANT CHANGE UP (ref 30–120)
ALT FLD-CCNC: 21 U/L DA — SIGNIFICANT CHANGE UP (ref 10–60)
ANION GAP SERPL CALC-SCNC: 7 MMOL/L — SIGNIFICANT CHANGE UP (ref 5–17)
APTT BLD: 28.3 SEC — SIGNIFICANT CHANGE UP (ref 27.5–35.5)
AST SERPL-CCNC: 16 U/L — SIGNIFICANT CHANGE UP (ref 10–40)
BASOPHILS # BLD AUTO: 0.05 K/UL — SIGNIFICANT CHANGE UP (ref 0–0.2)
BASOPHILS NFR BLD AUTO: 0.5 % — SIGNIFICANT CHANGE UP (ref 0–2)
BILIRUB SERPL-MCNC: 0.3 MG/DL — SIGNIFICANT CHANGE UP (ref 0.2–1.2)
BUN SERPL-MCNC: 7 MG/DL — SIGNIFICANT CHANGE UP (ref 7–23)
CALCIUM SERPL-MCNC: 8.5 MG/DL — SIGNIFICANT CHANGE UP (ref 8.4–10.5)
CHLORIDE SERPL-SCNC: 102 MMOL/L — SIGNIFICANT CHANGE UP (ref 96–108)
CO2 SERPL-SCNC: 28 MMOL/L — SIGNIFICANT CHANGE UP (ref 22–31)
CREAT SERPL-MCNC: 0.82 MG/DL — SIGNIFICANT CHANGE UP (ref 0.5–1.3)
EGFR: 99 ML/MIN/1.73M2 — SIGNIFICANT CHANGE UP
EOSINOPHIL # BLD AUTO: 0.13 K/UL — SIGNIFICANT CHANGE UP (ref 0–0.5)
EOSINOPHIL NFR BLD AUTO: 1.2 % — SIGNIFICANT CHANGE UP (ref 0–6)
GLUCOSE SERPL-MCNC: 86 MG/DL — SIGNIFICANT CHANGE UP (ref 70–99)
HCG UR QL: NEGATIVE — SIGNIFICANT CHANGE UP
HCT VFR BLD CALC: 37.4 % — SIGNIFICANT CHANGE UP (ref 34.5–45)
HGB BLD-MCNC: 12.5 G/DL — SIGNIFICANT CHANGE UP (ref 11.5–15.5)
IMM GRANULOCYTES NFR BLD AUTO: 0.4 % — SIGNIFICANT CHANGE UP (ref 0–0.9)
INR BLD: 1.15 RATIO — SIGNIFICANT CHANGE UP (ref 0.88–1.16)
LIDOCAIN IGE QN: 55 U/L — LOW (ref 73–393)
LYMPHOCYTES # BLD AUTO: 2.21 K/UL — SIGNIFICANT CHANGE UP (ref 1–3.3)
LYMPHOCYTES # BLD AUTO: 20 % — SIGNIFICANT CHANGE UP (ref 13–44)
MCHC RBC-ENTMCNC: 28.9 PG — SIGNIFICANT CHANGE UP (ref 27–34)
MCHC RBC-ENTMCNC: 33.4 GM/DL — SIGNIFICANT CHANGE UP (ref 32–36)
MCV RBC AUTO: 86.4 FL — SIGNIFICANT CHANGE UP (ref 80–100)
MONOCYTES # BLD AUTO: 0.88 K/UL — SIGNIFICANT CHANGE UP (ref 0–0.9)
MONOCYTES NFR BLD AUTO: 7.9 % — SIGNIFICANT CHANGE UP (ref 2–14)
NEUTROPHILS # BLD AUTO: 7.76 K/UL — HIGH (ref 1.8–7.4)
NEUTROPHILS NFR BLD AUTO: 70 % — SIGNIFICANT CHANGE UP (ref 43–77)
NRBC # BLD: 0 /100 WBCS — SIGNIFICANT CHANGE UP (ref 0–0)
PLATELET # BLD AUTO: 327 K/UL — SIGNIFICANT CHANGE UP (ref 150–400)
POTASSIUM SERPL-MCNC: 3.6 MMOL/L — SIGNIFICANT CHANGE UP (ref 3.5–5.3)
POTASSIUM SERPL-SCNC: 3.6 MMOL/L — SIGNIFICANT CHANGE UP (ref 3.5–5.3)
PROT SERPL-MCNC: 7 G/DL — SIGNIFICANT CHANGE UP (ref 6–8.3)
PROTHROM AB SERPL-ACNC: 13.3 SEC — SIGNIFICANT CHANGE UP (ref 10.5–13.4)
RBC # BLD: 4.33 M/UL — SIGNIFICANT CHANGE UP (ref 3.8–5.2)
RBC # FLD: 13.2 % — SIGNIFICANT CHANGE UP (ref 10.3–14.5)
SODIUM SERPL-SCNC: 137 MMOL/L — SIGNIFICANT CHANGE UP (ref 135–145)
WBC # BLD: 11.07 K/UL — HIGH (ref 3.8–10.5)
WBC # FLD AUTO: 11.07 K/UL — HIGH (ref 3.8–10.5)

## 2023-05-13 PROCEDURE — 72131 CT LUMBAR SPINE W/O DYE: CPT | Mod: MA

## 2023-05-13 PROCEDURE — 83690 ASSAY OF LIPASE: CPT

## 2023-05-13 PROCEDURE — 72125 CT NECK SPINE W/O DYE: CPT | Mod: 26,MA

## 2023-05-13 PROCEDURE — 70450 CT HEAD/BRAIN W/O DYE: CPT | Mod: 26,MA

## 2023-05-13 PROCEDURE — 96374 THER/PROPH/DIAG INJ IV PUSH: CPT

## 2023-05-13 PROCEDURE — 81025 URINE PREGNANCY TEST: CPT

## 2023-05-13 PROCEDURE — 85730 THROMBOPLASTIN TIME PARTIAL: CPT

## 2023-05-13 PROCEDURE — 74176 CT ABD & PELVIS W/O CONTRAST: CPT | Mod: 26,MA

## 2023-05-13 PROCEDURE — 85610 PROTHROMBIN TIME: CPT

## 2023-05-13 PROCEDURE — 96361 HYDRATE IV INFUSION ADD-ON: CPT

## 2023-05-13 PROCEDURE — 99284 EMERGENCY DEPT VISIT MOD MDM: CPT | Mod: 25

## 2023-05-13 PROCEDURE — 71250 CT THORAX DX C-: CPT | Mod: 26,MA

## 2023-05-13 PROCEDURE — 99284 EMERGENCY DEPT VISIT MOD MDM: CPT

## 2023-05-13 PROCEDURE — 71250 CT THORAX DX C-: CPT | Mod: MA

## 2023-05-13 PROCEDURE — 70450 CT HEAD/BRAIN W/O DYE: CPT | Mod: MA

## 2023-05-13 PROCEDURE — 36415 COLL VENOUS BLD VENIPUNCTURE: CPT

## 2023-05-13 PROCEDURE — 80053 COMPREHEN METABOLIC PANEL: CPT

## 2023-05-13 PROCEDURE — 72125 CT NECK SPINE W/O DYE: CPT | Mod: MA

## 2023-05-13 PROCEDURE — 85025 COMPLETE CBC W/AUTO DIFF WBC: CPT

## 2023-05-13 PROCEDURE — 96375 TX/PRO/DX INJ NEW DRUG ADDON: CPT

## 2023-05-13 PROCEDURE — 74176 CT ABD & PELVIS W/O CONTRAST: CPT | Mod: MA

## 2023-05-13 PROCEDURE — 72131 CT LUMBAR SPINE W/O DYE: CPT | Mod: 26,MA

## 2023-05-13 RX ORDER — MORPHINE SULFATE 50 MG/1
4 CAPSULE, EXTENDED RELEASE ORAL ONCE
Refills: 0 | Status: DISCONTINUED | OUTPATIENT
Start: 2023-05-13 | End: 2023-05-13

## 2023-05-13 RX ORDER — SODIUM CHLORIDE 9 MG/ML
1000 INJECTION INTRAMUSCULAR; INTRAVENOUS; SUBCUTANEOUS ONCE
Refills: 0 | Status: COMPLETED | OUTPATIENT
Start: 2023-05-13 | End: 2023-05-13

## 2023-05-13 RX ORDER — ONDANSETRON 8 MG/1
4 TABLET, FILM COATED ORAL ONCE
Refills: 0 | Status: COMPLETED | OUTPATIENT
Start: 2023-05-13 | End: 2023-05-13

## 2023-05-13 RX ADMIN — MORPHINE SULFATE 4 MILLIGRAM(S): 50 CAPSULE, EXTENDED RELEASE ORAL at 17:56

## 2023-05-13 RX ADMIN — SODIUM CHLORIDE 1000 MILLILITER(S): 9 INJECTION INTRAMUSCULAR; INTRAVENOUS; SUBCUTANEOUS at 18:25

## 2023-05-13 RX ADMIN — MORPHINE SULFATE 4 MILLIGRAM(S): 50 CAPSULE, EXTENDED RELEASE ORAL at 17:26

## 2023-05-13 RX ADMIN — ONDANSETRON 4 MILLIGRAM(S): 8 TABLET, FILM COATED ORAL at 17:26

## 2023-05-13 RX ADMIN — SODIUM CHLORIDE 1000 MILLILITER(S): 9 INJECTION INTRAMUSCULAR; INTRAVENOUS; SUBCUTANEOUS at 17:25

## 2023-05-13 NOTE — ED PROVIDER NOTE - DIFFERENTIAL DIAGNOSIS
Differential Diagnosis Differential including but not limited to rib fracture contusion pneumothorax pulmonary contusion spinal fracture herniated disc hip fracture intra-abdominal injury

## 2023-05-13 NOTE — ED PROVIDER NOTE - NSFOLLOWUPINSTRUCTIONS_ED_ALL_ED_FT
Occupational Therapy  Facility/Department: [facility]  Rehabilitation Occupational Therapy Daily Treatment Note    Date: 5/3/22  Patient Name: Beny Yu       Room: 0011/2416-46  MRN: [id number]  Account: [de-identified]   : 1950  (72 y.o.) Gender: female                    Past Medical History:  has a past medical history of Arthritis, Chronic back pain, GERD (gastroesophageal reflux disease), Hyperlipidemia, Hypertension, MVP (mitral valve prolapse), and Polio. Past Surgical History:   has a past surgical history that includes Hysterectomy; Hemorrhoid surgery (); Leg Surgery; back surgery; Colonoscopy; Breast surgery; and Tonsillectomy. Restrictions  Restrictions/Precautions: Surgical Protocols; Fall Risk;General Precautions  Implants present? : Metal implants (Back surgery, R ankle surgery)  Other position/activity restrictions: No lifting greater than 1-2 lbs for 14 days (sx 22)  Required Braces or Orthoses  Spinal: Lumbar Corset (donned when out of bed. )  Right Lower Extremity Brace: Ankle Foot Orthotics (Family brought in; Pt complains shoe doesn't fit well/AFO do)  Required Braces or Orthoses?: Yes    Subjective  Subjective: \"I got a good nights sleep\". Restrictions/Precautions: Surgical Protocols; Fall Risk;General Precautions        Pain Assessment  Pain Assessment: None - Denies Pain  Pain Level: 1  Patient's Stated Pain Goal: 3  Pain Location: Back  Pain Orientation: Lower  Pain Descriptors: Aching    Objective     Cognition  Overall Cognitive Status: WFL  Orientation  Overall Orientation Status: Within Functional Limits         ADL  Feeding  Assistance Level: Modified independent  Skilled Clinical Factors: per pt report    Grooming/Oral Hygiene  Assistance Level: Modified independent  Skilled Clinical Factors: standing at sink    Upper Extremity Bathing  Assistance Level: Modified independent  Skilled Clinical Factors: completed seated on tub bench    Lower Extremity Bathing  Assistance Level: Modified independent  Skilled Clinical Factors: good carryover to complete seated on bench    Upper Extremity Dressing  Assistance Level: Set-up; Modified independent  Skilled Clinical Factors: OH shirt and corset brace. Pt instruction in lumbar precautions to keep brace on until seated on shower chair, then keysha again before standing after bathing. Pt demos with good understanding and carryover. Lower Extremity Dressing  Assistance Level: Contact guard assist  Skilled Clinical Factors: CGA threading LLE with brace donned. Putting On/Taking Off Footwear  Assistance Level: Set-up (able to keysha slippers this AM before donnng lumbar brace. )  Skilled Clinical Factors: for donning slippers    Toileting  Assistance Level: Modified independent  Toilet Transfers  Technique:  (ambulating with RW)  Equipment: Raised toilet seat with arms  Assistance Level: Modified independent  Skilled Clinical Factors: good safety. Tub/Shower Transfers  Type: Shower  Transfer From: Rolling walker  Transfer To: Tub transfer bench  Assistance Level: Modified independent  Skilled Clinical Factors: steady. no LOB. Pt education provided on safety and precuations recommending a shower chair for use at home in order to best maintain back precuations and only remove brace during while seated for hygiene. Functional Mobility  Device: Rolling walker  Activity: Retrieve items;Transport items; To/From bathroom  Assistance Level: Modified independent  Skilled Clinical Factors: Pt ambulating around room to gather clothes from closet. Taking few side steps in bahtroom without RW. Steady, no LOB. Supine to Sit  Assistance Level: Modified independent  Skilled Clinical Factors: Pt demos with good safety ti keysha lumbar coreset upon sitting EOB.    Sit to Stand  Assistance Level: Modified independent  Skilled Clinical Factors: Cued for hand placement for safety  Stand to Sit  Assistance Level: Modified independent OT Exercises  Resistive Exercises: Pt instruction in tabletop task to play game with 1# wrist weights donned for facilitation of increased strength and activity tolerance. Pt tolerates well. Additional Activities  Pt and family education/training on use of AE inlcuding EZ slide, lumbar precautions, safety with use of RW and sequencing with showering to maintain precuations. Pt and family verblaize good understanding. Assessment  Assessment  Activity Tolerance: Patient tolerated treatment well;Patient limited by pain  Discharge Recommendations: Home with assist PRN;Patient would benefit from continued therapy after discharge  OT Equipment Recommendations  Equipment Needed: Yes  Mobility Devices: ADL Assistive Devices  ADL Assistive Devices: Shower Chair with back  [facility] in place: Yes  Type of devices: Left in chair;Call light within reach;Nurse notified    Patient Education  Education  Education Given To: Patient  Education Provided: Safety;Precautions; ADL Function;IADL Function; Energy Conservation;Plan of Care  Education Provided Comments: pt observed demo of transport item ie. plate of food while amb with RW, pt notes planning to have assist at first and goal to get off RW soon. Barriers to Learning: None  Education Outcome: Verbalized understanding;Demonstrated understanding    Plan  Plan  Times per Week: 5-7  Times per Day: Twice a day  Current Treatment Recommendations: Self-Care / ADL; Strengthening;ROM;Balance training;Functional mobility training; Endurance training;Pain management; Safety education & training;Patient/Caregiver education & training;Equipment evaluation, education, & procurement;Home management training    Goals  Patient Goals   Patient goals : Norm Saldaña now just get back to normal and hope that my pain is gone from my back. \"  Short Term Goals  Time Frame for Short term goals: By 1 week  Short Term Goal 1: Pt will complete lower body dressing/bathing with SBA and Good safety while maintaining back precautions  Short Term Goal 2: Pt will complete functional transfers/mobility during self care tasks with SBA and Good safety  Short Term Goal 3: Pt will complete upper body dressing with SBA and Good safety and compliance of back precautions  Short Term Goal 4: Pt will tolerate standing 5+ minutes during functional activity of choice with Good safety  Short Term Goal 5: Pt will verbalize/demonstrate good understanding AE/adaptive strategies/DME to assist in maintaining back precautions to increase safety and independence with self care and mobility  Short Term Goal 6: Pt will participate in 30+ minutes of theraputic exercises/functional activites to increase safety and independence with self care and mobility  Long Term Goals  Time Frame for Long term goals : By discharge  Long Term Goal 1: Pt will complete BADLs with modified independence and Good safety while maintaining back precautions  Long Term Goal 2: Pt will complete functional transfers/mobility during self care tasks with modified independence with Good safety  Long Term Goal 3: Pt will tolerate standing 10+ minutes during functional activity of choice with Good safety  Long Term Goal 4: Pt will complete simple meal prep/light house keeping task with Supervision and Good safety  Long Term Goal 5: Pt will verbalize/demonstrate Good understanding of home safety/fall prevention strategies to increase safety and independence with self care and mobility  Long Term Goal 6: Pt will demonstrate increased  strength during activities of daily living in R hand as evident by 5# improved  strength               05/03/22 1227 05/03/22 1538   OT Individual Minutes   Time In 0803 1402   Time Out 0903 1432   Minutes 60 30     ÁNGEL Au/TY Blunt Chest Trauma  Blunt chest trauma is an injury that is caused by a hard, direct hit to the chest. The hit can be strong enough to injure multiple body parts and organs. Blunt trauma does not involve a puncture of the skin.    Blunt chest trauma often results in bruised or broken (fractured) ribs. In many cases, the soft tissue in the chest wall is also injured, and that damage causes pain and bruising. Internal organs, such as the heart and lungs, can be injured as well.    Blunt chest trauma can lead to serious medical problems. This injury requires immediate medical care.    What are the causes?  This condition may be caused by:  Motor vehicle collisions.  Falls.  Physical violence.  Sports injuries.  What are the signs or symptoms?  Symptoms of this condition include:  Chest pain. The pain may be worse when you breathe deeply or move.  Shortness of breath.  Light-headedness.  Bruising.  Tenderness.  Swelling.  Feeling as if your heart is racing.  How is this diagnosed?  This condition is diagnosed based on your medical history and a physical exam. You may also have imaging tests, including:  X-ray.  CT scan.  MRI.  Ultrasound.  How is this treated?  Treatment for this condition depends on your injury type and severity of the injury. You may need to stay in the hospital. Treatment may include:  Pain medicine. You may be given pain medicine through an IV at first. You may also be given over-the-counter and prescription pain medicines.  Tubes or other devices, such as an incentive spirometer, to help you breathe.  Inserting a tube into your chest to drain excess fluid, blood, or air.  Fluid or blood transfusions through an IV.  Surgery to repair broken ribs and fix damaged tissue and organs.  Lung (pulmonary) rehabilitation. This may include exercises, counseling, or support groups.  Follow these instructions at home:  Medicines    Take over-the-counter and prescription medicines only as told by your health care provider.  Ask your health care provider if the medicine prescribed to you:  Requires you to avoid driving or using machinery.  Can cause constipation. You may need to take these actions to prevent or treat constipation:  Drink enough fluid to keep your urine pale yellow.  Take over-the-counter or prescription medicines.  Eat foods that are high in fiber, such as beans, whole grains, and fresh fruits and vegetables.  Limit foods that are high in fat and processed sugars, such as fried or sweet foods.  Managing pain and swelling      If directed, put ice on the injured area. To do this:  Put ice in a plastic bag.  Place a towel between your skin and the bag.  Leave the ice on for 20 minutes, 2–3 times a day.  Remove the ice if your skin turns bright red. This is very important. If you cannot feel pain, heat, or cold, you have a greater risk of damage to the area.  Activity      Do not lift anything that is heavier than 10 lb (4.5 kg), or the limit that you are told, until your health care provider says that it is safe.  Return to your normal activities as told by your health care provider. Ask your health care provider what activities are safe for you.  Do exercises as told by your health care provider.  General instructions    Do not use any products that contain nicotine or tobacco, such as cigarettes, e-cigarettes, and chewing tobacco. These can delay healing. If you need help quitting, ask your health care provider.  Use your incentive spirometer as told to help you take deep breaths.  Consider joining a support group. This may help you learn about your condition and techniques to help with recovery.  Keep all follow-up visits. This is important. Follow-up visits may include counseling.  Contact a health care provider if:  Your pain gets worse.  You develop a cough or wheezing.  Get help right away if:  You experience any of the following:  Shortness of breath.  Pain in your abdomen.  Nausea or vomiting.  A fever or chills.  A rapid heart rate.  You cough up blood.  You feel dizzy or weak.  You faint.  You have severe chest pain. This may come with other symptoms, such as:  Dizziness.  Shortness of breath.  Pain in your neck, jaw, or back, or in one arm or both arms.  These symptoms may represent a serious problem that is an emergency. Do not wait to see if the symptoms will go away. Get medical help right away. Call your local emergency services (911 in the [place] Do not drive yourself to the hospital.    Summary  Blunt chest trauma is an injury that is caused by a hard, direct hit to the chest. It may involve broken ribs, damage to the chest wall, and injury to internal organs such as the heart and lungs.  Blunt chest trauma can lead to serious medical problems. This injury requires immediate medical care.  Symptoms may include chest pain when moving or taking deep breaths, shortness of breath, bruising or swelling of the chest, faster heartbeat, and light-headedness.  Treatment for this condition depends on what type of injury you have and how severe it is.  Make sure you know which symptoms should cause you to get help right away.  This information is not intended to replace advice given to you by your health care provider. Make sure you discuss any questions you have with your health care provider.    Blunt Abdominal Trauma    Blunt abdominal trauma (BAT) includes injuries in which the wall of the abdomen and pelvis or the organs in the abdomen are damaged. These organs may include the liver, spleen, kidneys, stomach, intestines, pancreas, bladder, and uterus. Injured blood vessels from abdominal trauma and pelvic fractures can cause life-threatening internal bleeding. The damage can involve bruising, tearing, or rupture. BAT does not usually involve a puncture of the skin. Motor vehicle crashes are a common cause of BAT. Seat belts reduce the risk of serious injury but do not guarantee against abdominal injuries. If seat belts are worn incorrectly, they may actually cause an abdominal injury.    BAT can range from mild to severe. In some cases, it can lead to severe swelling and irritation of the tissue that lines the inner wall of the abdomen (peritonitis), severe bleeding, and a dangerous drop in blood pressure due to internal bleeding (hypotension and shock).    What are the causes?  This condition may be caused by a hard, direct hit to the abdomen. This can happen:  In a motor vehicle crash.  If you are kicked or punched in the abdomen.  From sports injuries, such as collisions with other players.  If you fall from a significant height.  If you wear your seat belt incorrectly, such as only using the lap belt.  In a motorcycle or bicycle crash from striking the handlebars.  What are the signs or symptoms?  The main symptom of this condition is pain and tenderness in the abdomen. The pain may spread to the back or shoulder. Symptoms are:  Bruising. (In severe motor vehicle crashes, there may be bruising to the area where the seat belt comes in contact with the body.)  Swelling.  Tenderness when pressing on the abdomen.  Blood in the urine.  Nausea and vomiting, and vomiting of blood.  Bloody stool or bleeding from the rectum.  Trouble breathing.  Other symptoms depend on the type and location of the injury. Symptoms can include:  Loss of consciousness.  Confusion.  Dizziness and light-headedness.  Anxiety.  Pale, dark, cool, or sweaty skin.  Weakness.  Symptoms of this injury can develop suddenly or slowly. Some injuries may not be noticed for several days.    How is this diagnosed?  This condition is diagnosed based on your symptoms and a physical exam. You may also have tests, including:  Blood tests.  Urine tests.  Imaging tests, such as:  An ultrasound of the abdomen to look for bleeding.  A CT scan of the abdomen to identify specific injuries.  X-rays of your chest, abdomen, and pelvis. These may show fractures of the ribs and pelvis.  How is this treated?  Treatment for this condition depends on the injury and how severe it is. Treatment may include:  Repeated exams of the abdomen. This is done if the injury is mild.  Support for blood pressure, heart rate, and breathing.  Getting blood, fluids, medicine, or nutrition (total parenteral nutrition, or TPN) through an IV.  Antibiotic medicine.  A blood transfusion. This may be given if you have lost a significant amount of blood.  Angiographic embolization. This is a procedure to stop bleeding by putting a small, thin tube (catheter) into one of your blood vessels. Steel coils or a clotting agent may then be placed into the bleeding vessel to stop blood loss.  Laparotomy. This is a surgery to open up your abdomen to control bleeding or repair damage. This may be done if you have peritonitis or bleeding that cannot be controlled with angiographic embolization. This also may be done if internal bleeding is suspected.  Follow these instructions at home:  Medicines    Take over-the-counter and prescription medicines only as told by your health care provider.  If you were prescribed an antibiotic medicine, take it as told by your health care provider. Do not stop using the antibiotic even if you start to feel better.  Ask your health care provider if the medicine prescribed to you:  Requires you to avoid driving or using machinery.  Can cause constipation. You may need to take these actions to prevent or treat constipation:  Drink enough fluid to keep your urine pale yellow.  Take over-the-counter or prescription medicines.  Eat foods that are high in fiber, such as beans, whole grains, and fresh fruits and vegetables.  Limit foods that are high in fat and processed sugars, such as fried or sweet foods.  General instructions    Do not use any products that contain nicotine or tobacco, such as cigarettes, e-cigarettes, and chewing tobacco. These can delay healing after an injury. If you need help quitting, ask your health care provider.  Return to your normal activities as told by your health care provider. Ask your health care provider what activities are safe for you.  Keep all follow-up visits. This is important to your treatment plan. Follow-up visits may include counseling and appointments with different medical specialists.  Get help right away if:  You have worsening abdominal, back, or chest pain.  You have worsening abdominal tenderness.  You have worsening abdominal swelling (distension).  You have any of these:  Blood in your urine or stool.  You stop producing urine or fail to have a bowel movement after 2–3 days.  Trouble with breathing.  A high fever.  Dizziness.  You vomit blood.  Your skin looks very pale.  These symptoms may represent a serious problem that is an emergency. Do not wait to see if the symptoms will go away. Get medical help right away. Call your local emergency services (911 in the [place] Do not drive yourself to the hospital.    Summary  Blunt abdominal trauma (BAT) includes injuries in which the wall of the abdomen and pelvis or the organs in the abdomen are damaged. It may be caused by a hard, direct hit to the abdomen. Motor vehicle crashes are a common cause of BAT.  Symptoms include pain and tenderness in the abdomen, bruising, swelling, blood in the urine or stool, difficulty breathing, weakness, confusion, or loss of consciousness.  Treatment for this condition depends on what type of injury you have and how severe it is. Some serious injuries require urgent surgery.  This information is not intended to replace advice given to you by your health care provider. Make sure you discuss any questions you have with your health care provider.    Motor Vehicle Collision Injury, Adult  After a motor vehicle collision, it is common to have injuries to the head, face, arms, and body. These injuries may include:  Cuts.  Burns.  Bruises.  Sore muscles and muscle strains.  Headaches.  You may have stiffness and soreness for the first several hours. You may feel worse after waking up the first morning after the collision. These injuries often feel worse for the first 24–48 hours. Your injuries should then begin to improve with each day. How quickly you improve often depends on:  The severity of the collision.  The number of injuries you have.  The location and nature of the injuries.  Whether you were wearing a seat belt and whether your airbag deployed.  A head injury may result in a concussion, which is a type of brain injury that can have serious effects. If you have a concussion, you should rest as told by your health care provider. You must be very careful to avoid having a second concussion.    Follow these instructions at home:  Medicines    Take over-the-counter and prescription medicines only as told by your health care provider.  If you were prescribed antibiotic medicine, take or apply it as told by your health care provider. Do not stop using the antibiotic even if your condition improves.  If you have a wound or a burn:    Two wounds closed with skin glue. One is normal. The other is red with pus and infected.  Clean your wound or burn as told by your health care provider.  Wash it with mild soap and water.  Rinse it with water to remove all soap.  Pat it dry with a clean towel. Do not rub it.  If you were told to put an ointment or cream on the wound, do so as told by your health care provider.  Follow instructions from your health care provider about how to take care of your wound or burn. Make sure you:  Know when and how to change or remove your bandage (dressing). Always wash your hands with soap and water before and after you change your dressing. If soap and water are not available, use hand .  Leave stitches (sutures), skin glue, or adhesive strips in place, if this applies. These skin closures may need to stay in place for 2 weeks or longer. If adhesive strip edges start to loosen and curl up, you may trim the loose edges. Do not remove adhesive strips completely unless your health care provider tells you to do that.  Do not:  Scratch or pick at the wound or burn.  Break any blisters you may have.  Peel any skin.  Avoid exposing your burn or wound to the sun.  Raise (elevate) the wound or burn above the level of your heart while you are sitting or lying down. This will help reduce pain, pressure, and swelling. If you have a wound or burn on your face, you may want to sleep with your head elevated. You may do this by putting an extra pillow under your head.  Check your wound or burn every day for signs of infection. Check for:  More redness, swelling, or pain.  More fluid or blood.  Warmth.  Pus or a bad smell.  Activity    Rest. Rest helps your body to heal. Make sure you:  Get plenty of sleep at night. Avoid staying up late.  Keep the same bedtime hours on weekends and weekdays.  Ask your health care provider if you have any lifting restrictions. Lifting can make neck or back pain worse.  Ask your health care provider when you can drive, ride a bicycle, or use heavy machinery. Your ability to react may be slower if you injured your head. Do not do these activities if you are dizzy.  If you are told to wear a brace on an injured arm, leg, or other part of your body, follow instructions from your health care provider about any activity restrictions related to driving, bathing, exercising, or working.  General instructions    Bag of ice on a towel on the skin.  A comparison of three sample cups showing dark yellow, yellow, and pale yellow urine.  If directed, put ice on the injured areas. This can help with pain and swelling.  Put ice in a plastic bag.  Place a towel between your skin and the bag.  Leave the ice on for 20 minutes, 2–3 times a day.  Drink enough fluid to keep your urine pale yellow.  Do not drink alcohol.  Maintain good nutrition.  Keep all follow-up visits as told by your health care provider. This is important.  Contact a health care provider if:  Your symptoms get worse.  You have neck pain that gets worse or has not improved after 1 week.  You have signs of infection in a wound or burn.  You have a fever.  You have any of the following symptoms for more than 2 weeks after your motor vehicle collision:  Lasting (chronic) headaches.  Dizziness or balance problems.  Nausea.  Vision problems.  Increased sensitivity to noise or light.  Depression or mood swings.  Anxiety or irritability.  Memory problems.  Trouble concentrating or paying attention.  Sleep problems.  Feeling tired all the time.  Get help right away if:  You have:  Numbness, tingling, or weakness in your arms or legs.  Severe neck pain, especially tenderness in the middle of the back of

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
## 2023-05-13 NOTE — ED ADULT NURSE NOTE - NSFALLUNIVINTERV_ED_ALL_ED
Bed/Stretcher in lowest position, wheels locked, appropriate side rails in place/Call bell, personal items and telephone in reach/Instruct patient to call for assistance before getting out of bed/chair/stretcher/Non-slip footwear applied when patient is off stretcher/[place] to call system/Physically safe environment - no spills, clutter or unnecessary equipment/Purposeful proactive rounding/Room/bathroom lighting operational, light cord in reach
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
## 2023-05-13 NOTE — ED PROVIDER NOTE - PROVIDER TOKENS
PROVIDER:[TOKEN:[[id number]],FOLLOWUP:[1-3 Days]],PROVIDER:[TOKEN:[[id number]],FOLLOWUP:[1-3 Days]]
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
## 2023-05-13 NOTE — ED PROVIDER NOTE - CARE PROVIDER_API CALL
Jesse Lovett)  Orthopedics  [address], [address]  [place] [address]  Phone: [phone]  Fax: [phone]  Follow Up Time: 1-3 Days    Virginie Rudolph)  Internal Medicine  [address] [place] [address]  Phone: [phone]  Fax: [phone]  Follow Up Time: 1-3 Days

## 2023-05-13 NOTE — ED PROVIDER NOTE - PATIENT PORTAL LINK FT
You can access the FollowMyHealth Patient Portal offered by [facility] by registering at the following website: [email or web address]. By joining [employer]’s FollowMyHealth portal, you will also be able to view your health information using other applications (apps) compatible with our system.

## 2024-01-25 ENCOUNTER — EMERGENCY (EMERGENCY)
Facility: HOSPITAL | Age: 29
LOS: 1 days | Discharge: HOME | End: 2024-01-25
Attending: STUDENT IN AN ORGANIZED HEALTH CARE EDUCATION/TRAINING PROGRAM
Payer: COMMERCIAL

## 2024-01-25 VITALS
SYSTOLIC BLOOD PRESSURE: 117 MMHG | HEART RATE: 78 BPM | TEMPERATURE: 98 F | RESPIRATION RATE: 19 BRPM | OXYGEN SATURATION: 95 % | DIASTOLIC BLOOD PRESSURE: 78 MMHG

## 2024-01-25 VITALS — RESPIRATION RATE: 24 BRPM

## 2024-01-25 PROBLEM — J45.909 UNSPECIFIED ASTHMA, UNCOMPLICATED: Chronic | Status: ACTIVE | Noted: 2023-01-11

## 2024-01-25 LAB
ALBUMIN SERPL ELPH-MCNC: 4 G/DL — SIGNIFICANT CHANGE UP (ref 3.5–5)
ALP SERPL-CCNC: 111 U/L — SIGNIFICANT CHANGE UP (ref 40–120)
ALT FLD-CCNC: 30 U/L DA — SIGNIFICANT CHANGE UP (ref 10–60)
ANION GAP SERPL CALC-SCNC: 7 MMOL/L — SIGNIFICANT CHANGE UP (ref 5–17)
AST SERPL-CCNC: 12 U/L — SIGNIFICANT CHANGE UP (ref 10–40)
BASOPHILS # BLD AUTO: 0.06 K/UL — SIGNIFICANT CHANGE UP (ref 0–0.2)
BASOPHILS NFR BLD AUTO: 0.6 % — SIGNIFICANT CHANGE UP (ref 0–2)
BILIRUB SERPL-MCNC: 0.2 MG/DL — SIGNIFICANT CHANGE UP (ref 0.2–1.2)
BUN SERPL-MCNC: 12 MG/DL — SIGNIFICANT CHANGE UP (ref 7–18)
CALCIUM SERPL-MCNC: 9.3 MG/DL — SIGNIFICANT CHANGE UP (ref 8.4–10.5)
CHLORIDE SERPL-SCNC: 108 MMOL/L — SIGNIFICANT CHANGE UP (ref 96–108)
CO2 SERPL-SCNC: 23 MMOL/L — SIGNIFICANT CHANGE UP (ref 22–31)
CREAT SERPL-MCNC: 0.9 MG/DL — SIGNIFICANT CHANGE UP (ref 0.5–1.3)
EGFR: 88 ML/MIN/1.73M2 — SIGNIFICANT CHANGE UP
EOSINOPHIL # BLD AUTO: 0.07 K/UL — SIGNIFICANT CHANGE UP (ref 0–0.5)
EOSINOPHIL NFR BLD AUTO: 0.7 % — SIGNIFICANT CHANGE UP (ref 0–6)
ETHANOL SERPL-MCNC: 8 MG/DL — SIGNIFICANT CHANGE UP (ref 0–10)
GLUCOSE SERPL-MCNC: 91 MG/DL — SIGNIFICANT CHANGE UP (ref 70–99)
HCT VFR BLD CALC: 39.4 % — SIGNIFICANT CHANGE UP (ref 34.5–45)
HGB BLD-MCNC: 13 G/DL — SIGNIFICANT CHANGE UP (ref 11.5–15.5)
IMM GRANULOCYTES NFR BLD AUTO: 0.6 % — SIGNIFICANT CHANGE UP (ref 0–0.9)
LYMPHOCYTES # BLD AUTO: 1.67 K/UL — SIGNIFICANT CHANGE UP (ref 1–3.3)
LYMPHOCYTES # BLD AUTO: 16.6 % — SIGNIFICANT CHANGE UP (ref 13–44)
MCHC RBC-ENTMCNC: 28.8 PG — SIGNIFICANT CHANGE UP (ref 27–34)
MCHC RBC-ENTMCNC: 33 GM/DL — SIGNIFICANT CHANGE UP (ref 32–36)
MCV RBC AUTO: 87.2 FL — SIGNIFICANT CHANGE UP (ref 80–100)
MONOCYTES # BLD AUTO: 0.74 K/UL — SIGNIFICANT CHANGE UP (ref 0–0.9)
MONOCYTES NFR BLD AUTO: 7.3 % — SIGNIFICANT CHANGE UP (ref 2–14)
NEUTROPHILS # BLD AUTO: 7.47 K/UL — HIGH (ref 1.8–7.4)
NEUTROPHILS NFR BLD AUTO: 74.2 % — SIGNIFICANT CHANGE UP (ref 43–77)
NRBC # BLD: 0 /100 WBCS — SIGNIFICANT CHANGE UP (ref 0–0)
PLATELET # BLD AUTO: 358 K/UL — SIGNIFICANT CHANGE UP (ref 150–400)
POTASSIUM SERPL-MCNC: 3.7 MMOL/L — SIGNIFICANT CHANGE UP (ref 3.5–5.3)
POTASSIUM SERPL-SCNC: 3.7 MMOL/L — SIGNIFICANT CHANGE UP (ref 3.5–5.3)
PROT SERPL-MCNC: 7.5 G/DL — SIGNIFICANT CHANGE UP (ref 6–8.3)
RBC # BLD: 4.52 M/UL — SIGNIFICANT CHANGE UP (ref 3.8–5.2)
RBC # FLD: 13.2 % — SIGNIFICANT CHANGE UP (ref 10.3–14.5)
SODIUM SERPL-SCNC: 138 MMOL/L — SIGNIFICANT CHANGE UP (ref 135–145)
WBC # BLD: 10.07 K/UL — SIGNIFICANT CHANGE UP (ref 3.8–10.5)
WBC # FLD AUTO: 10.07 K/UL — SIGNIFICANT CHANGE UP (ref 3.8–10.5)

## 2024-01-25 PROCEDURE — 99053 MED SERV 10PM-8AM 24 HR FAC: CPT

## 2024-01-25 PROCEDURE — 80053 COMPREHEN METABOLIC PANEL: CPT

## 2024-01-25 PROCEDURE — 85025 COMPLETE CBC W/AUTO DIFF WBC: CPT

## 2024-01-25 PROCEDURE — 93005 ELECTROCARDIOGRAM TRACING: CPT

## 2024-01-25 PROCEDURE — 36415 COLL VENOUS BLD VENIPUNCTURE: CPT

## 2024-01-25 PROCEDURE — 80307 DRUG TEST PRSMV CHEM ANLYZR: CPT

## 2024-01-25 PROCEDURE — 99284 EMERGENCY DEPT VISIT MOD MDM: CPT

## 2024-01-25 PROCEDURE — 99283 EMERGENCY DEPT VISIT LOW MDM: CPT | Mod: 25

## 2024-01-25 PROCEDURE — 93010 ELECTROCARDIOGRAM REPORT: CPT

## 2024-01-25 RX ORDER — HALOPERIDOL DECANOATE 100 MG/ML
5 INJECTION INTRAMUSCULAR ONCE
Refills: 0 | Status: COMPLETED | OUTPATIENT
Start: 2024-01-25 | End: 2024-01-25

## 2024-01-25 NOTE — ED PROVIDER NOTE - PATIENT PORTAL LINK FT
You can access the FollowMyHealth Patient Portal offered by [facility] by registering at the following website: [email or web address]. By joining [employer]’s FollowMyHealth portal, you will also be able to view your health information using other applications (apps) compatible with our system.

## 2024-01-25 NOTE — ED PROVIDER NOTE - CLINICAL SUMMARY MEDICAL DECISION MAKING FREE TEXT BOX
29-year-old female hx of asthma presenting today with AMS. Likely in the setting of drug ingestion. Labs wnl, including EtOH level. Will d/c

## 2024-01-25 NOTE — ED PROVIDER NOTE - NSFOLLOWUPINSTRUCTIONS_ED_ALL_ED_FT
You were seen in the emergency department for: confusion  Your results report is attached.  Please refrain from drug and alcohol use.   We recommend you follow up with: your primary care doctor    Please return to the Emergency Department if you experience any of the following symptoms:   - Shortness of breath or trouble breathing  - Pressure, pain or tightness in the chest  - Face drooping, arm weakness or speech difficulty  - Persistence of severe vomiting  - Head injury or loss of consciousness  - Nonstop bleeding or an open wound    (1) Follow up with your primary care physician within the next 24-48 hours as discussed. In addition, we did not find evidence of a life threatening illness on your testing here today, but listed below are the specialists that will be necessary to see as an outpatient to continue the workup.  Please call the numbers listed below or [phone] to set up the necessary appointments.  (2) Take Tylenol (up to 1000mg or 1 g)  and/or Motrin (up to 600mg) up to every 6 hours as needed for pain.   (3) If you had an IV (intravenous) line placed, it was removed. Sometimes, after IV removal, that area can be tender for a few days; if it develops redness and swelling, those could be signs of infection; in which case, return to the Emergency Department for assessment.  (4) Please continue taking all of your home medications as directed.
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
## 2024-01-25 NOTE — ED PROVIDER NOTE - CONSTITUTIONAL, MLM
awake, alert, oriented to person, place, time/situation, appears to be in distress crying/agitated stating she is going to die normal...

## 2024-02-01 LAB — DRUG SCREEN, SERUM: ABNORMAL

## 2024-05-09 ENCOUNTER — EMERGENCY (EMERGENCY)
Facility: HOSPITAL | Age: 29
LOS: 1 days | Discharge: HOME | End: 2024-05-09
Attending: EMERGENCY MEDICINE | Admitting: EMERGENCY MEDICINE
Payer: COMMERCIAL

## 2024-05-09 VITALS
HEART RATE: 110 BPM | RESPIRATION RATE: 14 BRPM | DIASTOLIC BLOOD PRESSURE: 107 MMHG | OXYGEN SATURATION: 99 % | TEMPERATURE: 99 F | SYSTOLIC BLOOD PRESSURE: 184 MMHG | HEIGHT: 63 IN | WEIGHT: 238.1 LBS

## 2024-05-09 PROCEDURE — 99284 EMERGENCY DEPT VISIT MOD MDM: CPT

## 2024-05-09 PROCEDURE — 99053 MED SERV 10PM-8AM 24 HR FAC: CPT

## 2024-05-09 RX ORDER — CYCLOBENZAPRINE HYDROCHLORIDE 10 MG/1
10 TABLET, FILM COATED ORAL ONCE
Refills: 0 | Status: COMPLETED | OUTPATIENT
Start: 2024-05-09 | End: 2024-05-09

## 2024-05-09 RX ORDER — KETOROLAC TROMETHAMINE 30 MG/ML
60 SYRINGE (ML) INJECTION ONCE
Refills: 0 | Status: DISCONTINUED | OUTPATIENT
Start: 2024-05-09 | End: 2024-05-09

## 2024-05-09 RX ADMIN — Medication 60 MILLIGRAM(S): at 23:51

## 2024-05-09 NOTE — ED ADULT NURSE NOTE - NSICDXFAMILYHX_GEN_ALL_CORE_FT
FAMILY HISTORY:  Grandparent  Still living? Unknown  Family history of duodenal cancer, Age at diagnosis: Age Unknown

## 2024-05-09 NOTE — ED ADULT NURSE NOTE - CHPI ED NUR SEVERITY2
PAIN SCALE 5 OF 10.
lives with  in a private home +1 small step to enter without handrail, no further stairs inside +walk-in-shower +fixed bench in shower/spouse

## 2024-05-09 NOTE — ED ADULT NURSE NOTE - NSFALLUNIVINTERV_ED_ALL_ED
Bed/Stretcher in lowest position, wheels locked, appropriate side rails in place/Call bell, personal items and telephone in reach/Instruct patient to call for assistance before getting out of bed/chair/stretcher/Non-slip footwear applied when patient is off stretcher/[place] to call system/Physically safe environment - no spills, clutter or unnecessary equipment/Purposeful proactive rounding/Room/bathroom lighting operational, light cord in reach
never used

## 2024-05-09 NOTE — ED ADULT TRIAGE NOTE - HEIGHT IN FEET
Osmar Trinidad is a 63 y.o. female    Chief Complaint   Patient presents with    Hypertension     1. Have you been to the ER, urgent care clinic since your last visit? Hospitalized since your last visit? No    2. Have you seen or consulted any other health care providers outside of the [address] [place] [address] since your last visit? Include any pap smears or colon screening.  No 5

## 2024-05-09 NOTE — ED ADULT NURSE NOTE - CHPI ED NUR SYMPTOMS NEG
no bruising/no decreased eating/drinking/no disorientation/no dizziness/no fussiness/no laceration/no loss of consciousness
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
## 2024-05-09 NOTE — ED PROVIDER NOTE - NSFOLLOWUPINSTRUCTIONS_ED_ALL_ED_FT
Lumbar Sprain  A lumbar sprain, which is sometimes called a low-back sprain, is a stretch or tear in the ligaments in the lower back (lumbar spine). Ligaments are the bands of tissue that connect bones to each other. This type of injury occurs when you stretch the ligaments beyond their limits.    Lumbar sprains can range from mild to severe. Mild sprains may involve stretching a ligament without tearing it. These may heal in 1–2 weeks. More severe sprains involve tearing of the ligament. These will cause more pain and may take 6–8 weeks to heal.    What are the causes?  This condition may be caused by:  Trauma, such as a fall or a hit to the body.  Twisting or overstretching the back. This may result from doing activities that take a lot of energy, such as lifting heavy objects.  What increases the risk?  A lumbar sprain is more common in:  Athletes.  People with obesity.  People who do repeated lifting, bending, or other movements that involve their back.  What are the signs or symptoms?  Symptoms of this condition may include:  Sharp or dull pain in the lower back that does not go away. The pain may spread to the buttocks.  Stiffness or limited range of motion.  Sudden muscle tightening (spasms).  How is this diagnosed?  This condition may be diagnosed based on:  Your symptoms.  Your medical history.  A physical exam.  Imaging tests, such as:  X-rays.  MRI.  How is this treated?  Treatment for this condition may include:  Resting the injured area.  Applying heat and cold to the affected area.  Over-the-counter medicines for pain and inflammation, such as NSAIDs.  Prescription medicine for pain or to relax the muscles. These may be needed for a short time.  Physical therapy exercises to improve movement and strength.  Follow these instructions at home:  Managing pain, stiffness, and swelling    Bag of ice on a towel on the skin.  A heating pad for use on the affected area.  If told, put ice on the injured area during the first 24 hours after your injury.  Put ice in a plastic bag.  Place a towel between your skin and the bag.  Leave the ice on for 20 minutes, 2–3 times a day.  If told, apply heat to the affected area as often as told by your health care provider. Use the heat source that your health care provider recommends, such as a moist heat pack or a heating pad.  Place a towel between your skin and the heat source.  Leave the heat on for 20–30 minutes.  If your skin turns bright red, remove the ice or heat right away to prevent skin damage. The risk of damage is higher if you cannot feel pain, heat, or cold.  Activity    Rest and return to your normal activities as told by your health care provider. Ask your health care provider what activities are safe for you.  Do exercises as told by your health care provider.  General instructions    Take over-the-counter and prescription medicines only as told by your health care provider.  Ask your health care provider if the medicine prescribed to you:  Requires you to avoid driving or using machinery.  Can cause constipation. You may need to take these actions to prevent or treat constipation:  Drink enough fluid to keep your urine pale yellow.  Take over-the-counter or prescription medicines.  Eat foods that are high in fiber, such as beans, whole grains, and fresh fruits and vegetables.  Limit foods that are high in fat and processed sugars, such as fried or sweet foods.  Do not use any products that contain nicotine or tobacco. These products include cigarettes, chewing tobacco, and vaping devices, such as e-cigarettes. If you need help quitting, ask your health care provider.  How is this prevented?  A person showing how to lift a heavy object. The correct way shows the person's knees bent.  To prevent a future low-back injury:  Always warm up properly before physical activity or sports.  Cool down and stretch after being active.  Use correct form when playing sports and lifting heavy objects. Bend your knees before you lift heavy objects.  Use good posture when sitting and standing.  Stay physically fit and keep a healthy weight.  Do at least 150 minutes of moderate-intensity exercise each week, such as brisk walking or water aerobics.  Do strength exercises at least 2 times each week.  Contact a health care provider if:  Your back pain does not improve after several weeks of treatment.  Your symptoms get worse.  You have a fever.  Get help right away if:  Your back pain is severe.  You are unable to stand or walk.  You develop pain in your legs.  You have weakness in your buttocks or legs.  You have trouble controlling when you urinate or when you have a bowel movement.  You have frequent, painful, or bloody urination.  This information is not intended to replace advice given to you by your health care provider. Make sure you discuss any questions you have with your health care provider.

## 2024-05-09 NOTE — ED PROVIDER NOTE - CONSTITUTIONAL, MLM
normal... Well appearing, awake, alert, oriented to person, place, time/situation and in discomfort, crying

## 2024-05-09 NOTE — ED PROVIDER NOTE - PATIENT PORTAL LINK FT
You can access the FollowMyHealth Patient Portal offered by [facility] by registering at the following website: [email or web address]. By joining [employer]’s FollowMyHealth portal, you will also be able to view your health information using other applications (apps) compatible with our system.
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
## 2024-05-10 VITALS
DIASTOLIC BLOOD PRESSURE: 65 MMHG | SYSTOLIC BLOOD PRESSURE: 106 MMHG | OXYGEN SATURATION: 98 % | TEMPERATURE: 98 F | RESPIRATION RATE: 16 BRPM | HEART RATE: 96 BPM

## 2024-05-10 PROCEDURE — 72110 X-RAY EXAM L-2 SPINE 4/>VWS: CPT | Mod: 26

## 2024-05-10 PROCEDURE — 72040 X-RAY EXAM NECK SPINE 2-3 VW: CPT | Mod: 26

## 2024-05-10 PROCEDURE — 72110 X-RAY EXAM L-2 SPINE 4/>VWS: CPT

## 2024-05-10 PROCEDURE — 72170 X-RAY EXAM OF PELVIS: CPT

## 2024-05-10 PROCEDURE — 72040 X-RAY EXAM NECK SPINE 2-3 VW: CPT

## 2024-05-10 PROCEDURE — 96372 THER/PROPH/DIAG INJ SC/IM: CPT

## 2024-05-10 PROCEDURE — 72170 X-RAY EXAM OF PELVIS: CPT | Mod: 26

## 2024-05-10 PROCEDURE — 99284 EMERGENCY DEPT VISIT MOD MDM: CPT

## 2024-05-10 RX ORDER — CYCLOBENZAPRINE HYDROCHLORIDE 10 MG/1
1 TABLET, FILM COATED ORAL
Qty: 21 | Refills: 0
Start: 2024-05-10 | End: 2024-05-16

## 2024-05-10 RX ADMIN — CYCLOBENZAPRINE HYDROCHLORIDE 10 MILLIGRAM(S): 10 TABLET, FILM COATED ORAL at 00:19

## 2024-05-10 RX ADMIN — Medication 60 MILLIGRAM(S): at 00:20

## 2024-05-10 NOTE — ED ADULT NURSE REASSESSMENT NOTE - NS ED NURSE REASSESS COMMENT FT1
MD aware of all results. pt medicated as ordered. instructed to follow up with ortho if she continues to have pain and discomforts. pt d/c to visitor in NAD

## 2024-06-16 ENCOUNTER — NON-APPOINTMENT (OUTPATIENT)
Age: 29
End: 2024-06-16

## 2024-06-17 ENCOUNTER — NON-APPOINTMENT (OUTPATIENT)
Age: 29
End: 2024-06-17

## 2025-07-25 ENCOUNTER — NON-APPOINTMENT (OUTPATIENT)
Age: 30
End: 2025-07-25

## 2025-07-25 ENCOUNTER — APPOINTMENT (OUTPATIENT)
Dept: OPHTHALMOLOGY | Facility: CLINIC | Age: 30
End: 2025-07-25
Payer: COMMERCIAL

## 2025-07-25 PROCEDURE — 92083 EXTENDED VISUAL FIELD XM: CPT

## 2025-07-25 PROCEDURE — 99204 OFFICE O/P NEW MOD 45 MIN: CPT
